# Patient Record
Sex: MALE | Race: WHITE | NOT HISPANIC OR LATINO | Employment: FULL TIME | URBAN - METROPOLITAN AREA
[De-identification: names, ages, dates, MRNs, and addresses within clinical notes are randomized per-mention and may not be internally consistent; named-entity substitution may affect disease eponyms.]

---

## 2017-02-17 RX ORDER — HYDROXYZINE PAMOATE 50 MG/1
CAPSULE ORAL
Qty: 60 CAP | Refills: 0 | Status: SHIPPED | OUTPATIENT
Start: 2017-02-17 | End: 2017-02-23

## 2017-02-23 ENCOUNTER — OFFICE VISIT (OUTPATIENT)
Dept: BEHAVIORAL HEALTH | Facility: PHYSICIAN GROUP | Age: 52
End: 2017-02-23
Payer: COMMERCIAL

## 2017-02-23 VITALS
WEIGHT: 295 LBS | HEART RATE: 60 BPM | HEIGHT: 77 IN | SYSTOLIC BLOOD PRESSURE: 133 MMHG | DIASTOLIC BLOOD PRESSURE: 83 MMHG | BODY MASS INDEX: 34.83 KG/M2

## 2017-02-23 DIAGNOSIS — F41.9 ANXIETY DISORDER, UNSPECIFIED TYPE: ICD-10-CM

## 2017-02-23 PROCEDURE — 99213 OFFICE O/P EST LOW 20 MIN: CPT | Performed by: PSYCHIATRY & NEUROLOGY

## 2017-02-23 RX ORDER — QUETIAPINE FUMARATE 25 MG/1
25 TABLET, FILM COATED ORAL
Qty: 30 TAB | Refills: 3 | Status: SHIPPED | OUTPATIENT
Start: 2017-02-23 | End: 2017-06-23

## 2017-02-23 RX ORDER — HYDROXYZINE 50 MG/1
50 TABLET, FILM COATED ORAL 2 TIMES DAILY
Qty: 60 TAB | Refills: 3 | Status: SHIPPED | OUTPATIENT
Start: 2017-02-23 | End: 2017-06-23 | Stop reason: SDUPTHER

## 2017-02-23 NOTE — PROGRESS NOTES
PSYCHIATRY FOLLOW-UP NOTE      Chief Complaint   Patient presents with   • Follow-Up     anxiety         History Of Present Illness:  Ed Mota is a 50 y.o. old male with anxiety disorder, s/p gastric bypass surgery in 2014 comes in today for follow up of his mood disorder, was last seen 2 months ago. He reports doing fine since his last visit here. He tried Buspirone but was unable to tolerate it due to side effects and quit taking it after 2.5 weeks. He had problems with his sleep and memory endorses some tingling in his hands which went away when he stopped Buspirone. He has been feeling better since he stopped it. He continues to take his Seroquel and Hydroxyzine at that time he likes this combination for his anxiety and sleep. Denies any new stressors. His father is still in the nursing home but is doing good from health standpoint. Appetite good. Denies problems with anger or irritability. Anxiety is stable and denies any panic attacks. Denies having thoughts of wanting to hurt himself or others.    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 No kids.     Substance Use:  Alcohol - Drinks every 3 weeks socially.   Nicotine - Denies recent use.   Illicit drugs - Smokes cannabis every 6 months or so. History of LSD, cocaine, mushrooms, acid use in the past, last use was 10 years ago.     Past Medication Trials:  Celexa (s/e - GI symptoms), Ambien, Buspirone (s/e - sleep problems, tingling feeling in hands)    Medications:  Current Outpatient Prescriptions   Medication Sig Dispense Refill   • quetiapine (SEROQUEL) 25 MG Tab Take 1 Tab by mouth every bedtime. 30 Tab 3   • hydrOXYzine (ATARAX) 50 MG Tab Take 1 Tab by mouth 2 Times a Day. 60 Tab 3   • Multiple Vitamins-Minerals (VITAMINS/MINERALS PO) Take  by mouth.       No current facility-administered medications for this visit.       Review Of Systems:    Constitutional - Negative for fatigue  Respiratory -  "Negative for shortness of breath, cough  CVS - Negative for chest pain, palpitations  GI - Negative for nausea, vomiting, abdominal pain, diarrhea, constipation  Musculoskeletal - Negative for back pain  Neurological - Negative for headaches  Psychiatric - Positive for anxiety    Physical Examination:  Vital signs: /83 mmHg  Pulse 60  Ht 1.956 m (6' 5\")  Wt 133.811 kg (295 lb)  BMI 34.97 kg/m2    Musculoskeletal: Normal gait. No abnormal movements.     Mental Status Evaluation:   General: Middle aged tall white male, dressed in casual attire, good grooming and hygiene, in no apparent distress, calm and cooperative, good eye contact, no psychomotor agitation or retardation  Orientation: Alert and oriented to person, place and time  Recent and remote memory: Grossly intact  Attention span and concentration: Grossly intact  Speech: Spontaneous, normal rate, rhythm and tone  Thought Process: Linear, logical and goal directed  Thought Content: Denies suicidal or homicidal ideations, intent or plan  Perception: Denies auditory or visual hallucinations. No delusions noted  Associations: Intact  Language: Appropriate  Fund of knowledge and vocabulary: Grossly adequate  Mood: \"good\"  Affect: Euthymic, mood congruent  Insight: Good  Judgment: Good      Impression:  1. Unspecified anxiety disorder    Medical Records/Labs/Diagnostic Tests Reviewed:  [place]  records - no controlled medications since 7/2015    Plan:  1. Discontinue Buspirone due to side effects  2. Continue Hydroxyzine 50mg twice daily as needed for anxiety and sleep.  3. Continue Seroquel 25mg at bedtime for sleep.   - AIMS 0 (9/2016)   - Metabolic monitoring: A1c and lipid profile ordered. He does get the blood work done at [facility] and will contact them to fax a copy of his lab results to our office.    Return to clinic in 4 months or sooner if symptoms worsen    The proposed treatment plan was discussed with the patient who was provided the " opportunity to ask questions and make suggestions regarding alternative treatment. Patient verbalized understanding and expressed agreement with the plan.     Agnieszka Amador M.D.  02/23/2017    This note was created using voice recognition software (Dragon). The accuracy of the dictation is limited by the abilities of the software. I have reviewed the note prior to signing, however some errors in grammar and context are still possible. If you have any questions related to this note please do not hesitate to contact our office.

## 2017-06-23 ENCOUNTER — OFFICE VISIT (OUTPATIENT)
Dept: BEHAVIORAL HEALTH | Facility: PHYSICIAN GROUP | Age: 52
End: 2017-06-23
Payer: COMMERCIAL

## 2017-06-23 VITALS
DIASTOLIC BLOOD PRESSURE: 98 MMHG | HEIGHT: 77 IN | WEIGHT: 294 LBS | BODY MASS INDEX: 34.71 KG/M2 | SYSTOLIC BLOOD PRESSURE: 143 MMHG | HEART RATE: 60 BPM

## 2017-06-23 DIAGNOSIS — F41.9 ANXIETY DISORDER, UNSPECIFIED TYPE: ICD-10-CM

## 2017-06-23 PROCEDURE — 99213 OFFICE O/P EST LOW 20 MIN: CPT | Performed by: PSYCHIATRY & NEUROLOGY

## 2017-06-23 RX ORDER — HYDROXYZINE 50 MG/1
50 TABLET, FILM COATED ORAL 2 TIMES DAILY PRN
Qty: 60 TAB | Refills: 6 | Status: SHIPPED | OUTPATIENT
Start: 2017-06-23 | End: 2017-12-20 | Stop reason: SDUPTHER

## 2017-06-23 NOTE — PROGRESS NOTES
PSYCHIATRY FOLLOW-UP NOTE      Chief Complaint   Patient presents with   • Follow-Up     anxiety         History Of Present Illness:  Ed Mota is a 50 y.o. old male with anxiety disorder, s/p gastric bypass surgery in 2014 comes in today for follow up of his mood disorder, was last seen 4 months ago. He was doing good since his last visit here. He was unable to get refills on his Seroquel for unclear reasons and has been off it for about 2 months. He contacted his pharmacy there was no refill request sent to our office for the same. He actually has been doing good since he has been off Seroquel. He has not noticed any worsening of his sleep or anxiety. He actually feels that his memory has improved since he has been off the Seroquel. He is using 100 mg of Hydroxyzine at bedtime which has been helping with his sleep and anxiety. Denies having constipation or any other side effects from Hydroxyzine. And is any new stressors. Denies any significant impairment from his anxiety in the last few months. Denies any current mood symptoms. Appetite has been good. Denies any recent panic attacks.    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 Lives with his wife in [place].    Substance Use:  Alcohol - Drinks every 3-4 weeks on social occasions   Nicotine - Denies recent use  Illicit drugs - Smokes cannabis every 1-2 months or so    Past Medication Trials:  Celexa (s/e - GI symptoms), Ambien, Buspirone (s/e - sleep problems, tingling feeling in hands), Seroquel 25 mg (effective for sleep)    Medications:  Current Outpatient Prescriptions   Medication Sig Dispense Refill   • hydrOXYzine (ATARAX) 50 MG Tab Take 1 Tab by mouth 2 times a day as needed for Anxiety (and/or sleep). 60 Tab 6   • Multiple Vitamins-Minerals (VITAMINS/MINERALS PO) Take  by mouth.       No current facility-administered medications for this visit.       Review Of Systems:    Constitutional - Negative for  "fatigue  Respiratory - Negative for shortness of breath, cough  CVS - Negative for chest pain, palpitations  GI - Negative for nausea, vomiting, abdominal pain, diarrhea, constipation  Musculoskeletal - Negative for back pain  Neurological - Negative for headaches  Psychiatric - Positive for occasional anxiety    Physical Examination:  Vital signs: /98 mmHg  Pulse 60  Ht 1.956 m (6' 5\")  Wt 133.358 kg (294 lb)  BMI 34.86 kg/m2    Musculoskeletal: Normal gait. No abnormal movements.     Mental Status Evaluation:   General: Middle aged tall white male, dressed in casual attire, good grooming and hygiene, in no apparent distress, calm and cooperative, good eye contact, no psychomotor agitation or retardation  Orientation: Alert and oriented to person, place and time  Recent and remote memory: Grossly intact  Attention span and concentration: Grossly intact  Speech: Spontaneous, normal rate, rhythm and tone  Thought Process: Linear, logical and goal directed  Thought Content: Denies suicidal or homicidal ideations, intent or plan  Perception: Denies auditory or visual hallucinations. No delusions noted  Associations: Intact  Language: Appropriate  Fund of knowledge and vocabulary: Grossly adequate  Mood: \"good\"  Affect: Euthymic, mood congruent  Insight: Good  Judgment: Good      Impression:  1. Unspecified anxiety disorder    Medical Records/Labs/Diagnostic Tests Reviewed:  [place]  records - no controlled medications in the last 1 year    Plan:  1. Continue Hydroxyzine 50 mg twice daily as needed for anxiety and/or sleep.    Return to clinic in 6 months or sooner if symptoms worsen    The proposed treatment plan was discussed with the patient who was provided the opportunity to ask questions and make suggestions regarding alternative treatment. Patient verbalized understanding and expressed agreement with the plan.     Agnieszka Amador M.D.  06/23/2017    This note was created using voice recognition software " (Jossue). The accuracy of the dictation is limited by the abilities of the software. I have reviewed the note prior to signing, however some errors in grammar and context are still possible. If you have any questions related to this note please do not hesitate to contact our office.

## 2017-07-25 ENCOUNTER — APPOINTMENT (OUTPATIENT)
Dept: RADIOLOGY | Facility: IMAGING CENTER | Age: 52
End: 2017-07-25
Attending: PHYSICIAN ASSISTANT
Payer: COMMERCIAL

## 2017-07-25 ENCOUNTER — OFFICE VISIT (OUTPATIENT)
Dept: URGENT CARE | Facility: CLINIC | Age: 52
End: 2017-07-25
Payer: COMMERCIAL

## 2017-07-25 VITALS
BODY MASS INDEX: 34.71 KG/M2 | RESPIRATION RATE: 16 BRPM | TEMPERATURE: 98.1 F | WEIGHT: 294 LBS | SYSTOLIC BLOOD PRESSURE: 138 MMHG | HEIGHT: 77 IN | OXYGEN SATURATION: 97 % | HEART RATE: 84 BPM | DIASTOLIC BLOOD PRESSURE: 90 MMHG

## 2017-07-25 DIAGNOSIS — M94.0 ACUTE COSTOCHONDRITIS: Primary | ICD-10-CM

## 2017-07-25 DIAGNOSIS — R07.81 RIB PAIN: ICD-10-CM

## 2017-07-25 DIAGNOSIS — R06.09 DYSPNEA ON EXERTION: ICD-10-CM

## 2017-07-25 PROCEDURE — 99214 OFFICE O/P EST MOD 30 MIN: CPT | Performed by: PHYSICIAN ASSISTANT

## 2017-07-25 PROCEDURE — 71020 DX-CHEST-2 VIEWS: CPT | Mod: 26 | Performed by: PHYSICIAN ASSISTANT

## 2017-07-25 RX ORDER — METHYLPREDNISOLONE 4 MG/1
4 TABLET ORAL DAILY
Qty: 1 KIT | Refills: 0 | Status: SHIPPED | OUTPATIENT
Start: 2017-07-25 | End: 2017-12-20

## 2017-07-25 RX ORDER — TRAMADOL HYDROCHLORIDE 50 MG/1
50-100 TABLET ORAL EVERY 4 HOURS PRN
Qty: 30 TAB | Refills: 0 | Status: SHIPPED | OUTPATIENT
Start: 2017-07-25 | End: 2017-12-20

## 2017-07-25 NOTE — PROGRESS NOTES
"Subjective:      PT is a 51 y.o. male who presents with Rib Pain            HPI  Pt states he was at a party this past weekend, 4 days ago, a was picked up from behind in a huge hug twice and noted lower rib pain and difficulty taking deep breaths the next day. Pt has not taken any Rx medications for this condition. Pt states the pain is a 6/10, aching in nature and worse at night. Pt denies  NVD, paresthesias, headaches, dizziness, change in vision, hives, or other joint pain. The pt's medication list, problem list, and allergies have been evaluated and reviewed during today's visit.    PMH:  Past Medical History   Diagnosis Date   • Sleep-related headache 7/20/2010   • Nocturnal dyspnea 7/20/2010   • Weight gain 7/20/2010   • Elevated BP 7/20/2010   • Obesity BMI 48 7/20/2010   • Excessive somnolence disorder 7/20/2010   • Snoring 7/20/2010   • Sleep related hypoxia 7/27/2010   • FARIDA (obstructive sleep apnea) 10/4/2010   • Depressive disorder 12/20/2010   • Hypertension      pt diagnosed by primary MD but BP has been running  \"108/68\" so he has not started med   • Foot fracture 11/12/2011   • S/P gastric bypass 11/12/2011   • Toe pain 3/19/2012   • Orthostatic hypotension 6/29/2012       PSH:  Past Surgical History   Procedure Laterality Date   • Tonsillectomy     • Rhinoplasty  1985   • Eye surgery  1/2001     Lasik   • Hammertoe correction     • Finger orif  1997     right ring   • Gastric bypass laparoscopic  2/7/2011     Performed by GANSER, JOHN H at [facility] Hx:    family history includes Cancer in his maternal uncle; Diabetes in his maternal grandfather; Heart Disease in his maternal uncle.         Soc HX:  Social History     Social History   • Marital Status:      Spouse Name: N/A   • Number of Children: 0   • Years of Education: N/A     Occupational History   •  [employer]     Social History Main Topics   • Smoking status: Former Smoker -- 1.00 " "packs/day for 30 years     Types: Cigarettes     Quit date: 11/01/2009   • Smokeless tobacco: Never Used   • Alcohol Use: 0.0 oz/week      Comment: one per month   • Drug Use: Yes     Special: Marijuana      Comment: Smokes cannabis 1-2 times a year   • Sexual Activity:     Partners: Female      Comment: Nuvaring     Other Topics Concern   • Not on file     Social History Narrative         Medications:    Current outpatient prescriptions:   •  MethylPREDNISolone (MEDROL DOSEPAK) 4 MG Tablet Therapy Pack, Take 1 Tab by mouth every day., Disp: 1 Kit, Rfl: 0  •  tramadol (ULTRAM) 50 MG Tab, Take 1-2 Tabs by mouth every four hours as needed for Moderate Pain., Disp: 30 Tab, Rfl: 0  •  hydrOXYzine (ATARAX) 50 MG Tab, Take 1 Tab by mouth 2 times a day as needed for Anxiety (and/or sleep)., Disp: 60 Tab, Rfl: 6  •  Multiple Vitamins-Minerals (VITAMINS/MINERALS PO), Take  by mouth., Disp: , Rfl:       Allergies:  Asa; Ibuprofen; and Nsaids      ROS  Constitutional: Negative for fever, chills and malaise/fatigue.   HENT: Negative for congestion and sore throat.    Eyes: Negative for blurred vision, double vision and photophobia.   Respiratory: POS shortness of breath.    Cardiovascular: Negative for chest pain and palpitations.   Gastrointestinal: Negative for heartburn, nausea, vomiting, abdominal pain, diarrhea and constipation.   Genitourinary: Negative for dysuria and flank pain.   Musculoskeletal: POS for rib pain B/L anteriorly.   Skin: Negative for itching and rash.   Neurological: Negative for dizziness, tingling and headaches.   Endo/Heme/Allergies: Does not bruise/bleed easily.   Psychiatric/Behavioral: Negative for depression. The patient is not nervous/anxious.           Objective:     /90 mmHg  Pulse 84  Temp(Src) 36.7 °C (98.1 °F)  Resp 16  Ht 1.956 m (6' 5\")  Wt 133.358 kg (294 lb)  BMI 34.86 kg/m2  SpO2 97%     Physical Exam   Pulmonary/Chest: Chest wall is not dull to percussion. He exhibits " tenderness. He exhibits no mass, no bony tenderness, no laceration, no crepitus, no edema, no deformity, no swelling and no retraction.             Constitutional: PT is oriented to person, place, and time. PT appears well-developed and well-nourished. No distress.   HENT:   Head: Normocephalic and atraumatic.   Mouth/Throat: Oropharynx is clear and moist. No oropharyngeal exudate.   Eyes: Conjunctivae normal and EOM are normal. Pupils are equal, round, and reactive to light.   Neck: Normal range of motion. Neck supple. No thyromegaly present.   Cardiovascular: Normal rate, regular rhythm, normal heart sounds and intact distal pulses.  Exam reveals no gallop and no friction rub.    No murmur heard.  Pulmonary/Chest: Effort normal and breath sounds normal. No respiratory distress. PT has no wheezes. PT has no rales.   Abdominal: Soft. Bowel sounds are normal. PT exhibits no distension and no mass. There is no tenderness. There is no rebound and no guarding.   Musculoskeletal: Normal range of motion. PT exhibits no edema and no tenderness.   Neurological: PT is alert and oriented to person, place, and time. PT has normal reflexes. No cranial nerve deficit.   Skin: Skin is warm and dry. No rash noted. PT is not diaphoretic. No erythema.       Psychiatric: PT has a normal mood and affect. PT behavior is normal. Judgment and thought content normal.       RADS:  Narrative        7/25/2017 10:08 AM    HISTORY/REASON FOR EXAM:  Shortness of Breath  Bear hugged around chest, painful since      TECHNIQUE/EXAM DESCRIPTION AND NUMBER OF VIEWS:  Two views of the chest.    COMPARISON:  1/12/2011.    FINDINGS:  No pulmonary infiltrates or consolidations are noted.  No pleural effusions, no pneumothorax are appreciated.  Stable cardiopericardial silhouette.       Impression          1. No active cardiopulmonary abnormalities are identified.            Reading Provider Reading Date     Joel Lee M.D. Jul 25, 2017             Signing Provider Signing Date Signing Time     Joel Lee M.D. Jul 25, 2017 10:20 AM            Assessment/Plan:     1. Acute costochondritis    - MethylPREDNISolone (MEDROL DOSEPAK) 4 MG Tablet Therapy Pack; Take 1 Tab by mouth every day.  Dispense: 1 Kit; Refill: 0  - tramadol (ULTRAM) 50 MG Tab; Take 1-2 Tabs by mouth every four hours as needed for Moderate Pain.  Dispense: 30 Tab; Refill: 0    2. Rib pain    - DX-CHEST-2 VIEWS; Future    3. Dyspnea on exertion    - DX-CHEST-2 VIEWS; Future    [place]  Aware web site evaluation: I have obtained and reviewed patient utilization report from [facility] pharmacy database prior to writing prescription for controlled substance II, III or IV per [place] bill . Based on the report and my clinical assessment the prescription is medically necessary.   NSAIDs for pain 1-5, Ultram for pain 6-10 or to help get to sleep.  Gentle ROM exercises discussed  WBAT BUE  Deep breathing exercises discussed  Ice/heat therapy discussed  Rest, fluids encouraged.  AVS with medical info given.  Pt was in full understanding and agreement with the plan.  Follow-up as needed if symptoms worsen or fail to improve.

## 2017-07-25 NOTE — Clinical Note
July 25, 2017       Patient: Ed Mota   YOB: 1965   Date of Visit: 7/25/2017         To Whom It May Concern:    It is my medical opinion that Ed Mota may be excused from work for the dates of 7/24/17-7/26/17.      If you have any questions or concerns, please don't hesitate to call [phone]          Sincerely,          Channing Najera PA-C  Electronically Signed

## 2017-07-25 NOTE — MR AVS SNAPSHOT
"        Ed Mota   2017 10:00 AM   Office Visit   MRN: [id number]    Department:  [facility]   Dept Phone:  [phone]    Description:  Male : 1965   Provider:  Channing Najera PA-C           Reason for Visit     Rib Pain was picked up by a bear hug and when woke up  could not move       Allergies as of 2017     Allergen Noted Reactions    Asa [Aspirin] 2012       Ibuprofen [Pavan Ibuprofen] 10/30/2011       Nsaids 2012         You were diagnosed with     Acute costochondritis   [921789]  -  Primary     Rib pain   [939673]       Dyspnea on exertion   [309257]         Vital Signs     Blood Pressure Pulse Temperature Respirations Height Weight    138/90 mmHg 84 36.7 °C (98.1 °F) 16 1.956 m (6' 5\") 133.358 kg (294 lb)    Body Mass Index Oxygen Saturation Smoking Status             34.86 kg/m2 97% Former Smoker         Basic Information     Date Of Birth Sex Race Ethnicity Preferred Language    1965 Male White Non- English      Your appointments     Dec 20, 2017  8:30 AM   Follow Up Med Management with Agnieszka Amador M.D.   [facility] [address] [place] [address])    [address] [place] [address]  [address] [place] [id number]   [phone]              Problem List              ICD-10-CM Priority Class Noted - Resolved    S/P gastric bypass Z98.84   2011 - Present    Essential tremor G25.0   4/10/2012 - Present    Orthostatic hypotension I95.1   2012 - Present    Insomnia G47.00   2013 - Present    Anxiety disorder F41.9   2016 - Present      Health Maintenance        Date Due Completion Dates    IMM DTaP/Tdap/Td Vaccine (1 - Tdap) 10/9/1983 ---    COLONOSCOPY 10/9/2014 ---    IMM INFLUENZA (1) 2012, 2011            Current Immunizations     Influenza TIV (IM) 2012, 2011      Below and/or attached are the medications your provider expects you to take. Review all of your home medications and newly ordered medications " with your provider and/or pharmacist. Follow medication instructions as directed by your provider and/or pharmacist. Please keep your medication list with you and share with your provider. Update the information when medications are discontinued, doses are changed, or new medications (including over-the-counter products) are added; and carry medication information at all times in the event of emergency situations     Allergies:  ASA - (reactions not documented)     IBUPROFEN - (reactions not documented)     NSAIDS - (reactions not documented)               Medications  Valid as of: July 25, 2017 - 10:52 AM    Generic Name Brand Name Tablet Size Instructions for use    HydrOXYzine HCl (Tab) ATARAX 50 MG Take 1 Tab by mouth 2 times a day as needed for Anxiety (and/or sleep).        MethylPREDNISolone (Tablet Therapy Pack) MEDROL DOSEPAK 4 MG Take 1 Tab by mouth every day.        Multiple Vitamins-Minerals   Take  by mouth.        TraMADol HCl (Tab) ULTRAM 50 MG Take 1-2 Tabs by mouth every four hours as needed for Moderate Pain.        .                 Medicines prescribed today were sent to:     [id number] - [place], [place] - [address] [address]    Phone: [phone] Fax: [phone]    Open 24 Hours?: No      Medication refill instructions:       If your prescription bottle indicates you have medication refills left, it is not necessary to call your provider’s office. Please contact your pharmacy and they will refill your medication.    If your prescription bottle indicates you do not have any refills left, you may request refills at any time through one of the following ways: The online P[employer] system (except Urgent Care), by calling your provider’s office, or by asking your pharmacy to contact your provider’s office with a refill request. Medication refills are processed only during regular business hours and may not be available until the next business day. Your  provider may request additional information or to have a follow-up visit with you prior to refilling your medication.   *Please Note: Medication refills are assigned a new Rx number when refilled electronically. Your pharmacy may indicate that no refills were authorized even though a new prescription for the same medication is available at the pharmacy. Please request the medicine by name with the pharmacy before contacting your provider for a refill.        Your To Do List     Future Labs/Procedures Complete By Expires    DX-CHEST-2 VIEWS  As directed 7/25/2018      Instructions    Costochondritis  Costochondritis, sometimes called Tietze syndrome, is a swelling and irritation (inflammation) of the tissue (cartilage) that connects your ribs with your breastbone (sternum). It causes pain in the chest and rib area. Costochondritis usually goes away on its own over time. It can take up to 6 weeks or longer to get better, especially if you are unable to limit your activities.  CAUSES   Some cases of costochondritis have no known cause. Possible causes include:  · Injury (trauma).  · Exercise or activity such as lifting.  · Severe coughing.  SIGNS AND SYMPTOMS  · Pain and tenderness in the chest and rib area.  · Pain that gets worse when coughing or taking deep breaths.  · Pain that gets worse with specific movements.  DIAGNOSIS   Your health care provider will do a physical exam and ask about your symptoms. Chest X-rays or other tests may be done to rule out other problems.  TREATMENT   Costochondritis usually goes away on its own over time. Your health care provider may prescribe medicine to help relieve pain.  HOME CARE INSTRUCTIONS   · Avoid exhausting physical activity. Try not to strain your ribs during normal activity. This would include any activities using chest, abdominal, and side muscles, especially if heavy weights are used.  · Apply ice to the affected area for the first 2 days after the pain  begins.  ¨ Put ice in a plastic bag.  ¨ Place a towel between your skin and the bag.  ¨ Leave the ice on for 20 minutes, 2-3 times a day.  · Only take over-the-counter or prescription medicines as directed by your health care provider.  SEEK MEDICAL CARE IF:  · You have redness or swelling at the rib joints. These are signs of infection.  · Your pain does not go away despite rest or medicine.  SEEK IMMEDIATE MEDICAL CARE IF:   · Your pain increases or you are very uncomfortable.  · You have shortness of breath or difficulty breathing.  · You cough up blood.  · You have worse chest pains, sweating, or vomiting.  · You have a fever or persistent symptoms for more than 2-3 days.  · You have a fever and your symptoms suddenly get worse.  MAKE SURE YOU:   · Understand these instructions.  · Will watch your condition.  · Will get help right away if you are not doing well or get worse.     This information is not intended to replace advice given to you by your health care provider. Make sure you discuss any questions you have with your health care provider.     Document Released: 09/27/2006 Document Revised: 10/08/2014 Document Reviewed: 07/22/2014  [employer] Interactive Patient Education ©2016 Elsevier Inc.            [employer] Access Code: [email or web address]  Expires: 8/24/2017 10:27 AM    [employer]  A secure, online tool to manage your health information     [employer]’s [employer]® is a secure, online tool that connects you to your personalized health information from the privacy of your home -- day or night - making it very easy for you to manage your healthcare. Once the activation process is completed, you can even access your medical information using the [employer] kay, which is available for free in the Apple Kay store or Google Play store.     [employer] provides the following levels of access (as shown below):   My Chart Features   [facility] Doctor [facility]  Specialists [facility]  Urgent  Care Non-[facility]  Primary  Care  Doctor   Email your healthcare team securely and privately 24/7 X X X    Manage appointments: schedule your next appointment; view details of past/upcoming appointments X      Request prescription refills. X      View recent personal medical records, including lab and immunizations X X X X   View health record, including health history, allergies, medications X X X X   Read reports about your outpatient visits, procedures, consult and ER notes X X X X   See your discharge summary, which is a recap of your hospital and/or ER visit that includes your diagnosis, lab results, and care plan. X X       How to register for [employer]:  1. Go to  [email or web address].  2. Click on the Sign Up Now box, which takes you to the New Member Sign Up page. You will need to provide the following information:  a. Enter your [employer] Access Code exactly as it appears at the top of this page. (You will not need to use this code after you’ve completed the sign-up process. If you do not sign up before the expiration date, you must request a new code.)   b. Enter your date of birth.   c. Enter your home email address.   d. Click Submit, and follow the next screen’s instructions.  3. Create a ComputeNext ID. This will be your [employer] login ID and cannot be changed, so think of one that is secure and easy to remember.  4. Create a [employer] password. You can change your password at any time.  5. Enter your Password Reset Question and Answer. This can be used at a later time if you forget your password.   6. Enter your e-mail address. This allows you to receive e-mail notifications when new information is available in [employer].  7. Click Sign Up. You can now view your health information.    For assistance activating your [employer] account, call [phone]

## 2017-07-25 NOTE — PATIENT INSTRUCTIONS
Costochondritis  Costochondritis, sometimes called Tietze syndrome, is a swelling and irritation (inflammation) of the tissue (cartilage) that connects your ribs with your breastbone (sternum). It causes pain in the chest and rib area. Costochondritis usually goes away on its own over time. It can take up to 6 weeks or longer to get better, especially if you are unable to limit your activities.  CAUSES   Some cases of costochondritis have no known cause. Possible causes include:  · Injury (trauma).  · Exercise or activity such as lifting.  · Severe coughing.  SIGNS AND SYMPTOMS  · Pain and tenderness in the chest and rib area.  · Pain that gets worse when coughing or taking deep breaths.  · Pain that gets worse with specific movements.  DIAGNOSIS   Your health care provider will do a physical exam and ask about your symptoms. Chest X-rays or other tests may be done to rule out other problems.  TREATMENT   Costochondritis usually goes away on its own over time. Your health care provider may prescribe medicine to help relieve pain.  HOME CARE INSTRUCTIONS   · Avoid exhausting physical activity. Try not to strain your ribs during normal activity. This would include any activities using chest, abdominal, and side muscles, especially if heavy weights are used.  · Apply ice to the affected area for the first 2 days after the pain begins.  ¨ Put ice in a plastic bag.  ¨ Place a towel between your skin and the bag.  ¨ Leave the ice on for 20 minutes, 2-3 times a day.  · Only take over-the-counter or prescription medicines as directed by your health care provider.  SEEK MEDICAL CARE IF:  · You have redness or swelling at the rib joints. These are signs of infection.  · Your pain does not go away despite rest or medicine.  SEEK IMMEDIATE MEDICAL CARE IF:   · Your pain increases or you are very uncomfortable.  · You have shortness of breath or difficulty breathing.  · You cough up blood.  · You have worse chest pains,  sweating, or vomiting.  · You have a fever or persistent symptoms for more than 2-3 days.  · You have a fever and your symptoms suddenly get worse.  MAKE SURE YOU:   · Understand these instructions.  · Will watch your condition.  · Will get help right away if you are not doing well or get worse.     This information is not intended to replace advice given to you by your health care provider. Make sure you discuss any questions you have with your health care provider.     Document Released: 09/27/2006 Document Revised: 10/08/2014 Document Reviewed: 07/22/2014  [employer] Interactive Patient Education ©2016 [employer].

## 2017-12-20 ENCOUNTER — OFFICE VISIT (OUTPATIENT)
Dept: BEHAVIORAL HEALTH | Facility: PHYSICIAN GROUP | Age: 52
End: 2017-12-20
Payer: COMMERCIAL

## 2017-12-20 VITALS
SYSTOLIC BLOOD PRESSURE: 155 MMHG | HEART RATE: 65 BPM | DIASTOLIC BLOOD PRESSURE: 96 MMHG | BODY MASS INDEX: 34.48 KG/M2 | WEIGHT: 292 LBS | HEIGHT: 77 IN

## 2017-12-20 DIAGNOSIS — F41.9 ANXIETY DISORDER, UNSPECIFIED TYPE: ICD-10-CM

## 2017-12-20 DIAGNOSIS — F51.04 CHRONIC INSOMNIA: ICD-10-CM

## 2017-12-20 PROCEDURE — 99213 OFFICE O/P EST LOW 20 MIN: CPT | Performed by: PSYCHIATRY & NEUROLOGY

## 2017-12-20 RX ORDER — HYDROXYZINE 50 MG/1
100 TABLET, FILM COATED ORAL
Qty: 180 TAB | Refills: 3 | Status: SHIPPED | OUTPATIENT
Start: 2017-12-20

## 2017-12-20 ASSESSMENT — PATIENT HEALTH QUESTIONNAIRE - PHQ9: CLINICAL INTERPRETATION OF PHQ2 SCORE: 0

## 2017-12-20 NOTE — PROGRESS NOTES
PSYCHIATRY FOLLOW-UP NOTE      Chief Complaint   Patient presents with   • Follow-Up     anxiety, insomnia         History Of Present Illness:  Ed Mota is a 52 y.o. old male with anxiety disorder, s/p gastric bypass surgery in 2014 comes in today for follow up, was last seen 6 months ago. He reports doing really good since his last visit here. He continues to take hydroxyzine 100 mg at bedtime which she feels helps with both his sleep and anxiety. He has noticed that if he gets a good nights sleep he is much less anxious the next day. Denies any recent panic attacks. He does feel that switching his offices from [place] to [place] has helped his anxiety a lot as well. Denies any impairments at work because of his anxiety. He lost his father about 2 2 months ago and feels that he is doing fine with the loss. He is trying to be supportive to his mom. Denies any current marital stressors. Denies any current mood or psychotic symptoms. Denies any side effects that he is noticed from hydroxyzine.    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  and has no kids. Lives with his wife in [place].    Substance Use:  Alcohol - Infrequent, drinks may be once a month  Nicotine - Denies recent use  Illicit drugs - Smokes cannabis recreationally every 1-2 months    Past Medication Trials:  Celexa (s/e - GI symptoms), Ambien, Buspirone (s/e - sleep problems, tingling feeling in hands), Seroquel 25 mg (effective for sleep)    Medications:  Current Outpatient Prescriptions   Medication Sig Dispense Refill   • hydrOXYzine HCl (ATARAX) 50 MG Tab Take 2 Tabs by mouth at bedtime as needed for Anxiety (and/or sleep). 180 Tab 3   • Multiple Vitamins-Minerals (VITAMINS/MINERALS PO) Take  by mouth.       No current facility-administered medications for this visit.        Review Of Systems:    Constitutional - Negative for fatigue  Respiratory - Negative for shortness of breath, cough  CVS - Negative for  "chest pain, palpitations  GI - Negative for nausea, vomiting, abdominal pain, diarrhea, constipation  Musculoskeletal - Negative for back pain  Neurological - Negative for headaches  Psychiatric - Positive for occasional anxiety    Physical Examination:  Vital signs: /96   Pulse 65   Ht 1.956 m (6' 5\")   Wt (!) 132.5 kg (292 lb)   BMI 34.63 kg/m²     Musculoskeletal: Normal gait. No abnormal movements.     Mental Status Evaluation:   General: Middle aged tall white male, dressed in casual attire, good grooming and hygiene, in no apparent distress, calm and cooperative, good eye contact, no psychomotor agitation or retardation  Orientation: Alert and oriented to person, place and time  Recent and remote memory: Grossly intact  Attention span and concentration: Grossly intact  Speech: Spontaneous, normal rate, rhythm and tone  Thought Process: Linear, logical and goal directed  Thought Content: Denies suicidal or homicidal ideations, intent or plan  Perception: Denies auditory or visual hallucinations. No delusions noted  Associations: Intact  Language: Appropriate  Fund of knowledge and vocabulary: Grossly adequate  Mood: \"doing really good\"  Affect: Euthymic, mood congruent  Insight: Good  Judgment: Good    Depression screening:  Depression Screen (PHQ-2/PHQ-9) 12/20/2017   PHQ-2 Total Score 0       Interpretation of PHQ-9 Total Score   Score Severity   1-4 No Depression   5-9 Mild Depression   10-14 Moderate Depression   15-19 Moderately Severe Depression   20-27 Severe Depression    Medical Records/Labs/Diagnostic Tests Reviewed:  [place] [facility] records - one opioid medication prescription in 2017, no abuse suspected      Impression:  1. Unspecified anxiety disorder  2. Chronic insomnia    Plan:  1. Continue Hydroxyzine 100 mg at bedtime as needed for anxiety and/or sleep.    Return to clinic in 1 year or sooner if symptoms worsen    The proposed treatment plan was discussed with the patient who was provided the " opportunity to ask questions and make suggestions regarding alternative treatment. Patient verbalized understanding and expressed agreement with the plan.     Agnieszka Amador M.D.  06/23/2017    This note was created using voice recognition software (Dragon). The accuracy of the dictation is limited by the abilities of the software. I have reviewed the note prior to signing, however some errors in grammar and context are still possible. If you have any questions related to this note please do not hesitate to contact our office.

## 2018-03-07 ENCOUNTER — OFFICE VISIT (OUTPATIENT)
Dept: URGENT CARE | Facility: CLINIC | Age: 53
End: 2018-03-07
Payer: COMMERCIAL

## 2018-03-07 VITALS
RESPIRATION RATE: 16 BRPM | HEART RATE: 101 BPM | WEIGHT: 276 LBS | OXYGEN SATURATION: 97 % | TEMPERATURE: 99.1 F | HEIGHT: 77 IN | SYSTOLIC BLOOD PRESSURE: 118 MMHG | DIASTOLIC BLOOD PRESSURE: 84 MMHG | BODY MASS INDEX: 32.59 KG/M2

## 2018-03-07 DIAGNOSIS — J11.1 INFLUENZA: ICD-10-CM

## 2018-03-07 LAB
FLUAV+FLUBV AG SPEC QL IA: NEGATIVE
INT CON NEG: NORMAL
INT CON POS: NORMAL

## 2018-03-07 PROCEDURE — 99214 OFFICE O/P EST MOD 30 MIN: CPT | Performed by: PHYSICIAN ASSISTANT

## 2018-03-07 PROCEDURE — 87804 INFLUENZA ASSAY W/OPTIC: CPT | Performed by: PHYSICIAN ASSISTANT

## 2018-03-07 ASSESSMENT — ENCOUNTER SYMPTOMS
HEMOPTYSIS: 0
SPUTUM PRODUCTION: 0
PALPITATIONS: 0
CHILLS: 1
WHEEZING: 0
MYALGIAS: 1
COUGH: 1
FATIGUE: 1
FEVER: 1
HEADACHES: 1
SORE THROAT: 1
SHORTNESS OF BREATH: 0

## 2018-03-07 ASSESSMENT — PATIENT HEALTH QUESTIONNAIRE - PHQ9: CLINICAL INTERPRETATION OF PHQ2 SCORE: 0

## 2018-03-07 NOTE — LETTER
March 7, 2018         Patient: Ed Mota   YOB: 1965   Date of Visit: 3/7/2018           To Whom it May Concern:    Ed Mota was seen in my clinic on 3/7/2018. Please excuse him from work 3/7-3/9/2018.    If you have any questions or concerns, please don't hesitate to call.        Sincerely,           Ren Dc P.A.-C.  Electronically Signed

## 2018-03-07 NOTE — PROGRESS NOTES
Subjective:      Ed Mota is a 52 y.o. male who presents with Pharyngitis (cough, headache,body aches x6days)            Influenza   This is a new problem. The current episode started today. The problem occurs constantly. The problem has been unchanged. Associated symptoms include chills, congestion, coughing, fatigue, a fever, headaches, myalgias and a sore throat. Pertinent negatives include no chest pain. Nothing aggravates the symptoms. He has tried NSAIDs for the symptoms. The treatment provided moderate relief.       Review of Systems   Constitutional: Positive for chills, fatigue, fever and malaise/fatigue.   HENT: Positive for congestion and sore throat. Negative for ear pain.    Respiratory: Positive for cough. Negative for hemoptysis, sputum production, shortness of breath and wheezing.    Cardiovascular: Negative for chest pain and palpitations.   Musculoskeletal: Positive for myalgias.   Neurological: Positive for headaches.   All other systems reviewed and are negative.    PMH:  has a past medical history of Depressive disorder (12/20/2010); Elevated BP (7/20/2010); Excessive somnolence disorder (7/20/2010); Foot fracture (11/12/2011); Hypertension; Nocturnal dyspnea (7/20/2010); Obesity BMI 48 (7/20/2010); Orthostatic hypotension (6/29/2012); FARIDA (obstructive sleep apnea) (10/4/2010); S/P gastric bypass (11/12/2011); Sleep related hypoxia (7/27/2010); Sleep-related headache (7/20/2010); Snoring (7/20/2010); Toe pain (3/19/2012); and Weight gain (7/20/2010).  MEDS:   Current Outpatient Prescriptions:   •  Hydrocod Polst-CPM Polst ER (TUSSIONEX PENNKINETIC ER) 10-8 MG/5ML Suspension Extended Release, Take 5 mL by mouth every 12 hours for 10 days., Disp: 100 mL, Rfl: 0  •  hydrOXYzine HCl (ATARAX) 50 MG Tab, Take 2 Tabs by mouth at bedtime as needed for Anxiety (and/or sleep)., Disp: 180 Tab, Rfl: 3  •  Multiple Vitamins-Minerals (VITAMINS/MINERALS PO), Take  by mouth., Disp: , Rfl:   ALLERGIES:  "  Allergies   Allergen Reactions   • Asa [Aspirin]    • Ibuprofen [Pavan Ibuprofen]    • Nsaids      SURGHX:   Past Surgical History:   Procedure Laterality Date   • GASTRIC BYPASS LAPAROSCOPIC  2/7/2011    Performed by GANSER, JOHN H at [facility]   • EYE SURGERY  1/2001    Lasik   • FINGER ORIF  1997    right ring   • RHINOPLASTY  1985   • HAMMERTOE CORRECTION     • TONSILLECTOMY       SOCHX:  reports that he quit smoking about 8 years ago. His smoking use included Cigarettes. He has a 30.00 pack-year smoking history. He has never used smokeless tobacco. He reports that he drinks alcohol. He reports that he uses drugs, including Marijuana.  FH: Family history was reviewed, no pertinent findings to report  Medications, Allergies, and current problem list reviewed today in Epic       Objective:     /84   Pulse (!) 101   Temp 37.3 °C (99.1 °F)   Resp 16   Ht 1.956 m (6' 5\")   Wt (!) 125.2 kg (276 lb)   SpO2 97%   BMI 32.73 kg/m²      Physical Exam   Constitutional: He is oriented to person, place, and time. He appears well-developed and well-nourished. He is active.  Non-toxic appearance. He does not have a sickly appearance. He does not appear ill. No distress. He is not intubated.   HENT:   Head: Normocephalic and atraumatic.   Right Ear: Hearing, tympanic membrane, external ear and ear canal normal.   Left Ear: Hearing, tympanic membrane, external ear and ear canal normal.   Nose: Nose normal.   Mouth/Throat: Uvula is midline, oropharynx is clear and moist and mucous membranes are normal.   Eyes: Conjunctivae, EOM and lids are normal.   Neck: Normal range of motion. Neck supple.   Cardiovascular: Regular rhythm, S1 normal, S2 normal and normal heart sounds.  Exam reveals no gallop and no friction rub.    No murmur heard.  Pulmonary/Chest: Effort normal and breath sounds normal. No accessory muscle usage. No apnea, no tachypnea and no bradypnea. He is not intubated. No respiratory " distress. He has no decreased breath sounds. He has no wheezes. He has no rhonchi. He has no rales. He exhibits no tenderness.   Musculoskeletal: Normal range of motion.   Neurological: He is alert and oriented to person, place, and time.   Skin: Skin is warm and dry.   Psychiatric: He has a normal mood and affect. His speech is normal and behavior is normal. Judgment and thought content normal.   Vitals reviewed.              Assessment/Plan:   Exam, history, and symptoms consistent with influenza.    1. Influenza    - POCT Influenza A/B  - Hydrocod Polst-CPM Polst ER (TUSSIONEX PENNKINETIC ER) 10-8 MG/5ML Suspension Extended Release; Take 5 mL by mouth every 12 hours for 10 days.  Dispense: 100 mL; Refill: 0    Differential diagnosis, natural history, supportive care discussed. Follow-up with primary care provider within 7-10 days, emergency room precautions discussed.  Patient and/or family appears understanding of information.

## 2018-03-07 NOTE — PATIENT INSTRUCTIONS
